# Patient Record
Sex: FEMALE | Race: WHITE | Employment: UNEMPLOYED | URBAN - METROPOLITAN AREA
[De-identification: names, ages, dates, MRNs, and addresses within clinical notes are randomized per-mention and may not be internally consistent; named-entity substitution may affect disease eponyms.]

---

## 2023-02-12 ENCOUNTER — HOSPITAL ENCOUNTER (INPATIENT)
Age: 58
LOS: 1 days | Discharge: LEFT BEFORE DISCHARGE | DRG: 392 | End: 2023-02-13
Attending: EMERGENCY MEDICINE | Admitting: INTERNAL MEDICINE
Payer: MEDICARE

## 2023-02-12 DIAGNOSIS — R10.84 GENERALIZED ABDOMINAL PAIN: Primary | ICD-10-CM

## 2023-02-12 DIAGNOSIS — R11.0 NAUSEA: ICD-10-CM

## 2023-02-12 PROBLEM — I77.4 MEDIAN ARCUATE LIGAMENT SYNDROME (HCC): Status: ACTIVE | Noted: 2023-02-12

## 2023-02-12 LAB
ANION GAP SERPL CALCULATED.3IONS-SCNC: 10 MMOL/L (ref 9–17)
BUN SERPL-MCNC: 14 MG/DL (ref 6–20)
CALCIUM SERPL-MCNC: 8.2 MG/DL (ref 8.6–10.4)
CHLORIDE SERPL-SCNC: 103 MMOL/L (ref 98–107)
CO2 SERPL-SCNC: 20 MMOL/L (ref 20–31)
CREAT SERPL-MCNC: 0.65 MG/DL (ref 0.5–0.9)
GFR SERPL CREATININE-BSD FRML MDRD: >60 ML/MIN/1.73M2
GLUCOSE SERPL-MCNC: 106 MG/DL (ref 70–99)
LACTIC ACID, SEPSIS WHOLE BLOOD: 0.6 MMOL/L (ref 0.5–1.9)
LACTIC ACID, SEPSIS WHOLE BLOOD: 1.2 MMOL/L (ref 0.5–1.9)
MAGNESIUM SERPL-MCNC: 1.6 MG/DL (ref 1.6–2.6)
POTASSIUM SERPL-SCNC: 3.8 MMOL/L (ref 3.7–5.3)
SODIUM SERPL-SCNC: 133 MMOL/L (ref 135–144)

## 2023-02-12 PROCEDURE — 96372 THER/PROPH/DIAG INJ SC/IM: CPT

## 2023-02-12 PROCEDURE — 83605 ASSAY OF LACTIC ACID: CPT

## 2023-02-12 PROCEDURE — 2580000003 HC RX 258: Performed by: STUDENT IN AN ORGANIZED HEALTH CARE EDUCATION/TRAINING PROGRAM

## 2023-02-12 PROCEDURE — 83735 ASSAY OF MAGNESIUM: CPT

## 2023-02-12 PROCEDURE — 6360000002 HC RX W HCPCS: Performed by: STUDENT IN AN ORGANIZED HEALTH CARE EDUCATION/TRAINING PROGRAM

## 2023-02-12 PROCEDURE — 87040 BLOOD CULTURE FOR BACTERIA: CPT

## 2023-02-12 PROCEDURE — 36415 COLL VENOUS BLD VENIPUNCTURE: CPT

## 2023-02-12 PROCEDURE — 99285 EMERGENCY DEPT VISIT HI MDM: CPT

## 2023-02-12 PROCEDURE — 80048 BASIC METABOLIC PNL TOTAL CA: CPT

## 2023-02-12 PROCEDURE — 2060000000 HC ICU INTERMEDIATE R&B

## 2023-02-12 PROCEDURE — 93005 ELECTROCARDIOGRAM TRACING: CPT | Performed by: STUDENT IN AN ORGANIZED HEALTH CARE EDUCATION/TRAINING PROGRAM

## 2023-02-12 RX ORDER — ATORVASTATIN CALCIUM 20 MG/1
20 TABLET, FILM COATED ORAL DAILY
Status: DISCONTINUED | OUTPATIENT
Start: 2023-02-13 | End: 2023-02-13 | Stop reason: HOSPADM

## 2023-02-12 RX ORDER — ONDANSETRON 2 MG/ML
4 INJECTION INTRAMUSCULAR; INTRAVENOUS EVERY 6 HOURS PRN
Status: DISCONTINUED | OUTPATIENT
Start: 2023-02-12 | End: 2023-02-13 | Stop reason: HOSPADM

## 2023-02-12 RX ORDER — ONDANSETRON 4 MG/1
4 TABLET, ORALLY DISINTEGRATING ORAL EVERY 8 HOURS PRN
Status: DISCONTINUED | OUTPATIENT
Start: 2023-02-12 | End: 2023-02-13 | Stop reason: HOSPADM

## 2023-02-12 RX ORDER — SODIUM CHLORIDE 0.9 % (FLUSH) 0.9 %
5-40 SYRINGE (ML) INJECTION EVERY 12 HOURS SCHEDULED
Status: DISCONTINUED | OUTPATIENT
Start: 2023-02-13 | End: 2023-02-13 | Stop reason: HOSPADM

## 2023-02-12 RX ORDER — LIOTHYRONINE SODIUM 5 UG/1
5 TABLET ORAL DAILY
Status: DISCONTINUED | OUTPATIENT
Start: 2023-02-13 | End: 2023-02-13 | Stop reason: HOSPADM

## 2023-02-12 RX ORDER — FAMOTIDINE 40 MG/1
40 TABLET, FILM COATED ORAL 2 TIMES DAILY
COMMUNITY

## 2023-02-12 RX ORDER — POLYETHYLENE GLYCOL 3350 17 G/17G
17 POWDER, FOR SOLUTION ORAL DAILY PRN
Status: DISCONTINUED | OUTPATIENT
Start: 2023-02-12 | End: 2023-02-13 | Stop reason: HOSPADM

## 2023-02-12 RX ORDER — 0.9 % SODIUM CHLORIDE 0.9 %
30 INTRAVENOUS SOLUTION INTRAVENOUS ONCE
Status: DISCONTINUED | OUTPATIENT
Start: 2023-02-12 | End: 2023-02-12

## 2023-02-12 RX ORDER — CLONAZEPAM 1 MG/1
1.5 TABLET ORAL 2 TIMES DAILY PRN
COMMUNITY

## 2023-02-12 RX ORDER — LOSARTAN POTASSIUM 50 MG/1
50 TABLET ORAL DAILY
Status: DISCONTINUED | OUTPATIENT
Start: 2023-02-13 | End: 2023-02-13 | Stop reason: HOSPADM

## 2023-02-12 RX ORDER — LIOTHYRONINE SODIUM 5 UG/1
5 TABLET ORAL DAILY
COMMUNITY

## 2023-02-12 RX ORDER — SODIUM CHLORIDE, SODIUM LACTATE, POTASSIUM CHLORIDE, CALCIUM CHLORIDE 600; 310; 30; 20 MG/100ML; MG/100ML; MG/100ML; MG/100ML
INJECTION, SOLUTION INTRAVENOUS CONTINUOUS
Status: DISCONTINUED | OUTPATIENT
Start: 2023-02-12 | End: 2023-02-13 | Stop reason: HOSPADM

## 2023-02-12 RX ORDER — ACETAMINOPHEN 325 MG/1
650 TABLET ORAL EVERY 6 HOURS PRN
Status: DISCONTINUED | OUTPATIENT
Start: 2023-02-12 | End: 2023-02-13 | Stop reason: HOSPADM

## 2023-02-12 RX ORDER — SODIUM CHLORIDE 9 MG/ML
INJECTION, SOLUTION INTRAVENOUS PRN
Status: DISCONTINUED | OUTPATIENT
Start: 2023-02-12 | End: 2023-02-13 | Stop reason: HOSPADM

## 2023-02-12 RX ORDER — PROMETHAZINE HYDROCHLORIDE 25 MG/ML
25 INJECTION, SOLUTION INTRAMUSCULAR; INTRAVENOUS ONCE
Status: COMPLETED | OUTPATIENT
Start: 2023-02-12 | End: 2023-02-12

## 2023-02-12 RX ORDER — FLUOXETINE HYDROCHLORIDE 20 MG/1
20 CAPSULE ORAL DAILY
Status: DISCONTINUED | OUTPATIENT
Start: 2023-02-13 | End: 2023-02-13 | Stop reason: HOSPADM

## 2023-02-12 RX ORDER — ATORVASTATIN CALCIUM 20 MG/1
20 TABLET, FILM COATED ORAL DAILY
COMMUNITY

## 2023-02-12 RX ORDER — ENOXAPARIN SODIUM 100 MG/ML
40 INJECTION SUBCUTANEOUS DAILY
Status: DISCONTINUED | OUTPATIENT
Start: 2023-02-13 | End: 2023-02-13 | Stop reason: HOSPADM

## 2023-02-12 RX ORDER — LEVOTHYROXINE SODIUM 112 UG/1
112 TABLET ORAL DAILY
Status: DISCONTINUED | OUTPATIENT
Start: 2023-02-13 | End: 2023-02-13 | Stop reason: HOSPADM

## 2023-02-12 RX ORDER — LEVOTHYROXINE SODIUM 112 UG/1
112 TABLET ORAL DAILY
COMMUNITY

## 2023-02-12 RX ORDER — ACETAMINOPHEN 650 MG/1
650 SUPPOSITORY RECTAL EVERY 6 HOURS PRN
Status: DISCONTINUED | OUTPATIENT
Start: 2023-02-12 | End: 2023-02-13 | Stop reason: HOSPADM

## 2023-02-12 RX ORDER — SODIUM CHLORIDE 0.9 % (FLUSH) 0.9 %
5-40 SYRINGE (ML) INJECTION PRN
Status: DISCONTINUED | OUTPATIENT
Start: 2023-02-12 | End: 2023-02-13 | Stop reason: HOSPADM

## 2023-02-12 RX ORDER — LOSARTAN POTASSIUM 50 MG/1
50 TABLET ORAL DAILY
COMMUNITY

## 2023-02-12 RX ORDER — FAMOTIDINE 20 MG/1
40 TABLET, FILM COATED ORAL 2 TIMES DAILY
Status: DISCONTINUED | OUTPATIENT
Start: 2023-02-13 | End: 2023-02-13 | Stop reason: HOSPADM

## 2023-02-12 RX ORDER — FLUOXETINE HYDROCHLORIDE 20 MG/1
20 CAPSULE ORAL DAILY
COMMUNITY

## 2023-02-12 RX ADMIN — PROMETHAZINE HYDROCHLORIDE 25 MG: 25 INJECTION INTRAMUSCULAR; INTRAVENOUS at 18:54

## 2023-02-12 RX ADMIN — SODIUM CHLORIDE 2721 ML: 9 INJECTION, SOLUTION INTRAVENOUS at 18:41

## 2023-02-12 RX ADMIN — SODIUM CHLORIDE, POTASSIUM CHLORIDE, SODIUM LACTATE AND CALCIUM CHLORIDE: 600; 310; 30; 20 INJECTION, SOLUTION INTRAVENOUS at 20:21

## 2023-02-12 ASSESSMENT — ENCOUNTER SYMPTOMS
ABDOMINAL PAIN: 1
SHORTNESS OF BREATH: 0
DIARRHEA: 0
CHOKING: 0
CHEST TIGHTNESS: 0
SORE THROAT: 0
ANAL BLEEDING: 0
VOMITING: 0
ABDOMINAL DISTENTION: 0
RHINORRHEA: 0
EYE DISCHARGE: 0
BACK PAIN: 0
EYE ITCHING: 0
APNEA: 0
COUGH: 0
COLOR CHANGE: 0
NAUSEA: 1
BLOOD IN STOOL: 0
CONSTIPATION: 0

## 2023-02-12 NOTE — ED PROVIDER NOTES
[facility] ED  Emergency Department Encounter  Emergency Medicine Resident     Pt Name:Lien Corbett  MRN: [id number]  [place]ig[place]ia 1965  Date of evaluation: 2/12/23  PCP:  BRITNI Quan CNP  Note Started: 5:30 PM EST      CHIEF COMPLAINT       Chief Complaint   Patient presents with    Abdominal Pain     X6 days    Nausea       HISTORY OF PRESENT ILLNESS  (Location/Symptom, Timing/Onset, Context/Setting, Quality, Duration, Modifying Factors, Severity.)      Hector Uribe is a 61 y.o. female who presents with nausea and abdominal pain for the past week. Patient reports 1 week prior to today she had a knee surgery on her right meniscus. She reports ever since the operation she has had abdominal pain and nausea. She reports that she has been taking Zofran at home for this problem with minimal improvement. She denies any active vomiting and reports having bowel movements in the last 24 hours. She denies any diarrhea or blood in her stool. She reports that her pain is severe and crampy and sharp throughout her abdomen and seems to come in waves. She reports that Ramond Ace she was in 10 out of 10 pain but after receiving pain medication her pain improved. She is currently in about 2 out of 10 pain and acknowledges no focal point of tenderness. Patient denies any chest pain or shortness of breath, fever, chills, cough, sore throat, runny nose. PAST MEDICAL / SURGICAL / SOCIAL / FAMILY HISTORY      has no past medical history on file. History of Hashimoto thyroiditis, sarcoidosis, rheumatoid arthritis, back injury, left wrist injury, GERD     has no past surgical history on file.   History of uvulopalatopharyngoplasty, EGD, kidney and ureter strictures, meniscus the right knee surgery    Social History     Socioeconomic History    Marital status: Single     Spouse name: Not on file    Number of children: Not on file    Years of education: Not on file    Highest education level: Not on file   Occupational History    Not on file   Tobacco Use    Smoking status: Not on file    Smokeless tobacco: Not on file   Substance and Sexual Activity    Alcohol use: Not on file    Drug use: Not on file    Sexual activity: Not on file   Other Topics Concern    Not on file   Social History Narrative    Not on file     Social Determinants of Health     Financial Resource Strain: Not on file   Food Insecurity: Not on file   Transportation Needs: Not on file   Physical Activity: Not on file   Stress: Not on file   Social Connections: Not on file   Intimate Partner Violence: Not on file   Housing Stability: Not on file       No family history on file. Allergies: Avelox [moxifloxacin], Bactrim [sulfamethoxazole-trimethoprim], Macrobid [nitrofurantoin], Sulfa antibiotics, Vancomycin, and Ibuprofen    Home Medications:  Prior to Admission medications    Medication Sig Start Date End Date Taking? Authorizing Provider   atorvastatin (LIPITOR) 20 MG tablet Take 20 mg by mouth daily   Yes Historical Provider, MD   clonazePAM (KLONOPIN) 1 MG tablet Take 1.5 mg by mouth 2 times daily as needed. Yes Historical Provider, MD   famotidine (PEPCID) 40 MG tablet Take 40 mg by mouth 2 times daily   Yes Historical Provider, MD   FLUoxetine (PROZAC) 20 MG capsule Take 20 mg by mouth daily   Yes Historical Provider, MD   levothyroxine (SYNTHROID) 112 MCG tablet Take 112 mcg by mouth Daily   Yes Historical Provider, MD   liothyronine (CYTOMEL) 5 MCG tablet Take 5 mcg by mouth daily   Yes Historical Provider, MD   losartan (COZAAR) 50 MG tablet Take 50 mg by mouth daily   Yes Historical Provider, MD     Patient takes fluoxetine, atorvastatin, clonazepam, famotidine, hydrochlorothiazide losartan, lansoprazole, levothyroxine and liothyronine    REVIEW OF SYSTEMS       Review of Systems   Constitutional:  Positive for appetite change. Negative for activity change, chills and fever.    HENT:  Negative for rhinorrhea and sore throat. Respiratory:  Negative for cough and shortness of breath. Cardiovascular:  Negative for chest pain. Gastrointestinal:  Positive for abdominal pain and nausea. Negative for abdominal distention, blood in stool, constipation, diarrhea and vomiting. Musculoskeletal:  Positive for joint swelling. Skin:  Positive for wound. Neurological:  Negative for dizziness and light-headedness. PHYSICAL EXAM      INITIAL VITALS:   /80   Pulse 86   Temp 100.1 °F (37.8 °C) (Oral)   Resp 16   Wt 200 lb (90.7 kg)   SpO2 96%     Physical Exam  Vitals reviewed. Constitutional:       General: She is not in acute distress. Appearance: She is not ill-appearing, toxic-appearing or diaphoretic. HENT:      Head: Normocephalic and atraumatic. Cardiovascular:      Rate and Rhythm: Normal rate and regular rhythm. Pulmonary:      Effort: Pulmonary effort is normal.      Breath sounds: Normal breath sounds. Abdominal:      General: Abdomen is flat. Bowel sounds are normal. There is no distension. Palpations: Abdomen is soft. Tenderness: There is generalized abdominal tenderness. Musculoskeletal:      Right lower leg: Edema present. Skin:     General: Skin is warm and dry. Comments: Right knee has 3 sites of sutures that are intact without erythema   Neurological:      General: No focal deficit present. Mental Status: She is alert and oriented to person, place, and time. DDX/DIAGNOSTIC RESULTS / EMERGENCY DEPARTMENT COURSE / MDM     Medical Decision Making  Patient is a 48-year-old female presenting with 1 week of waxing and waning nausea and abdominal pain. Patient was seen at [facility] and on CT was found to have suggestion of median arcuate ligament syndrome. Patient was transferred to [facility] for vascular work-up and evaluation.   Differential diagnosis includes gastritis, enteritis, ischemic mesentery, bowel ischemia, perforated viscus, choledocholithiasis, cholecystitis, nephrolithiasis. Amount and/or Complexity of Data Reviewed  Independent Historian: EMS     Details: Details also obtained from EMS and from paperwork from [facility]  External Data Reviewed: notes. Labs: ordered. Decision-making details documented in ED Course. ECG/medicine tests: ordered. Risk  Prescription drug management. Decision regarding hospitalization. EKG    All EKG's are interpreted by the Emergency Department Physician who either signs or Co-signs this chart in the absence of a cardiologist.    EMERGENCY DEPARTMENT COURSE:    ED Course as of 02/12/23 1956   Sun Feb 12, 2023 1730 Patient is a transfer from [place] [address] due to possible median arcuate ligament syndrome. Patient presented earlier to [place] [address] with abdominal pain nausea and vomiting for the past 7 days on and off worsening in the last 24 hours. Vascular surgery consulted at this time. [HO]   1731 Lactic at [place] [address] appears to have been 10.7 milligrams per deciliter. Will recheck. Patient's vital signs are stable at this time and she is reporting minimal pain at this time. She does however report nausea and will provide medications for the same. Will initiate fluid bolus. [HO]   1812 Lactic Acid, Sepsis, Whole Blood: 1.2 [HO]   1837 CBC and BMP obtained at [place] [address] appear within normal limits [HO]   1955 Vascular surgery requesting admission to internal medicine for further work-up tomorrow with mesenteric duplex, requesting that we keep patient n.p.o. at this time. Internal medicine consult placed. [HO]   1956 Patient will be handed off to another resident for signout to internal medicine. [HO]      ED Course User Index  Tracey Roberts MD       PROCEDURES:  none    CONSULTS:  IP CONSULT TO VASCULAR SURGERY  IP CONSULT TO INTERNAL MEDICINE    CRITICAL CARE:  There was significant risk of life threatening deterioration of patient's condition requiring my direct management. Critical care time less than 30 minutes, excluding any documented procedures. FINAL IMPRESSION      1. Generalized abdominal pain    2. Nausea          DISPOSITION / PLAN     DISPOSITION Decision To Admit 02/12/2023 07:38:16 PM      PATIENT REFERRED TO:  No follow-up provider specified.     DISCHARGE MEDICATIONS:  New Prescriptions    No medications on file       Cally Dawson MD  Emergency Medicine Resident    (Please note that portions of thisnote were completed with a voice recognition program.  Efforts were made to edit the dictations but occasionally words are mis-transcribed.)      Geo Martinez MD  Resident  02/12/23 4498

## 2023-02-12 NOTE — ED PROVIDER NOTES
[facility]     Emergency Department     Faculty Attestation    I performed a history and physical examination of the patient and discussed management with the resident. I reviewed the residents note and agree with the documented findings and plan of care. Any areas of disagreement are noted on the chart. I was personally present for the key portions of any procedures. I have documented in the chart those procedures where I was not present during the key portions. I have reviewed the emergency nurses triage note. I agree with the chief complaint, past medical history, past surgical history, allergies, medications, social and family history as documented unless otherwise noted below. For Physician Assistant/ Nurse Practitioner cases/documentation I have personally evaluated this patient and have completed at least one if not all key elements of the E/M (history, physical exam, and MDM). Additional findings are as noted. I have personally seen and evaluated the patient. I find the patient's history and physical exam are consistent with the NP/PA documentation. I agree with the care provided, treatment rendered, disposition and follow-up plan. 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 Patient recently had meniscal surgery on her knee, then started having abdominal pain over the last 7 days. Has had nausea, vomiting, and diarrhea. No blood in the emesis or diarrhea. Abdominal pain abruptly worsened last night, was unable to have bowel movement or vomit, and pain continued to worsen. Was seen at [facility] and found to have a lactate of 10.7, CT was obtained showing possible median arcuate ligament syndrome with celiac artery stenosis. Patient was transferred for vascular surgery evaluation. Currently pain is well controlled, but she is still nauseated.     Exam:  General : Laying on the bed, awake, alert, and in no acute distress  CV : normal rate and regular rhythm  Lungs : Breathing comfortably on room air with no tachypnea, hypoxia, or increased work of breathing        Medical Decision Making  Amount and/or Complexity of Data Reviewed  Labs: ordered. Decision-making details documented in ED Course. ECG/medicine tests: ordered and independent interpretation performed. Details: Normal sinus rhythm at 82 bpm. Normal intervals, normal axis. No ST segment elevation or depression. TWI in lead V1, flattening in lead III. No ischemic changes. Nonspecific EKG    Risk  Prescription drug management. Plan: We will continue to trend lactate, add cultures as it does not appear that septic work-up was initiated at [facility].  We will give IV fluids. Vascular surgery consulted upon arrival for further evaluation and treatment. Anticipate admission.     Jaimie Nielsen MD   Attending Emergency Physician    (Please note that portions of this note were completed with a voice recognition program. Efforts were made to edit the dictations but occasionally words are mis-transcribed.)            Jaimie Nielsen MD  02/12/23 1786       Jaimie Nielsen MD  02/12/23 2088

## 2023-02-12 NOTE — ED NOTES
The following labs were labeled with appropriate pt sticker and tubed to lab:     [] Blue     [] Lavender   [] on ice  [] Green/yellow  [x] Green/black [x] on ice  [] Ashley Gunning  [] on ice  [] Yellow  [] Red  [] Type/ Screen  [] ABG  [] VBG    [] COVID-19 swab    [] Rapid  [] PCR  [] Flu swab  [] Peds Viral Panel     [] Urine Sample  [] Fecal Sample  [] Pelvic Cultures  [x] Blood Cultures  [x] X 2  [] STREP Cultures         Maricruz Serrano RN  02/12/23 7098

## 2023-02-13 VITALS
BODY MASS INDEX: 36.6 KG/M2 | HEIGHT: 63 IN | RESPIRATION RATE: 18 BRPM | OXYGEN SATURATION: 94 % | SYSTOLIC BLOOD PRESSURE: 116 MMHG | TEMPERATURE: 97.9 F | HEART RATE: 83 BPM | WEIGHT: 206.57 LBS | DIASTOLIC BLOOD PRESSURE: 76 MMHG

## 2023-02-13 PROBLEM — R10.84 GENERALIZED ABDOMINAL PAIN: Status: ACTIVE | Noted: 2023-02-13

## 2023-02-13 PROBLEM — R11.0 NAUSEA: Status: ACTIVE | Noted: 2023-02-13

## 2023-02-13 PROBLEM — R11.0 NAUSEA: Status: RESOLVED | Noted: 2023-02-13 | Resolved: 2023-02-13

## 2023-02-13 PROBLEM — R10.84 GENERALIZED ABDOMINAL PAIN: Status: RESOLVED | Noted: 2023-02-13 | Resolved: 2023-02-13

## 2023-02-13 LAB
ABSOLUTE EOS #: 0.13 K/UL (ref 0–0.44)
ABSOLUTE IMMATURE GRANULOCYTE: 0.03 K/UL (ref 0–0.3)
ABSOLUTE LYMPH #: 1.83 K/UL (ref 1.1–3.7)
ABSOLUTE MONO #: 0.5 K/UL (ref 0.1–1.2)
ALBUMIN SERPL-MCNC: 3.6 G/DL (ref 3.5–5.2)
ALBUMIN/GLOBULIN RATIO: 1.6 (ref 1–2.5)
ALP SERPL-CCNC: 86 U/L (ref 35–104)
ALT SERPL-CCNC: 21 U/L (ref 5–33)
ANION GAP SERPL CALCULATED.3IONS-SCNC: 10 MMOL/L (ref 9–17)
AST SERPL-CCNC: 21 U/L
BASOPHILS # BLD: 0 % (ref 0–2)
BASOPHILS ABSOLUTE: <0.03 K/UL (ref 0–0.2)
BILIRUB DIRECT SERPL-MCNC: 0.2 MG/DL
BILIRUB INDIRECT SERPL-MCNC: 1.4 MG/DL (ref 0–1)
BILIRUB SERPL-MCNC: 1.6 MG/DL (ref 0.3–1.2)
BUN SERPL-MCNC: 13 MG/DL (ref 6–20)
CALCIUM SERPL-MCNC: 8.7 MG/DL (ref 8.6–10.4)
CHLORIDE SERPL-SCNC: 102 MMOL/L (ref 98–107)
CO2 SERPL-SCNC: 22 MMOL/L (ref 20–31)
CREAT SERPL-MCNC: 0.71 MG/DL (ref 0.5–0.9)
EKG ATRIAL RATE: 82 BPM
EKG P AXIS: 37 DEGREES
EKG P-R INTERVAL: 138 MS
EKG Q-T INTERVAL: 360 MS
EKG QRS DURATION: 74 MS
EKG QTC CALCULATION (BAZETT): 420 MS
EKG R AXIS: 48 DEGREES
EKG T AXIS: 43 DEGREES
EKG VENTRICULAR RATE: 82 BPM
EOSINOPHILS RELATIVE PERCENT: 2 % (ref 1–4)
GFR SERPL CREATININE-BSD FRML MDRD: >60 ML/MIN/1.73M2
GLUCOSE SERPL-MCNC: 108 MG/DL (ref 70–99)
HCT VFR BLD AUTO: 34.7 % (ref 36.3–47.1)
HGB BLD-MCNC: 11.5 G/DL (ref 11.9–15.1)
IMMATURE GRANULOCYTES: 1 %
LYMPHOCYTES # BLD: 32 % (ref 24–43)
MCH RBC QN AUTO: 28.5 PG (ref 25.2–33.5)
MCHC RBC AUTO-ENTMCNC: 33.1 G/DL (ref 28.4–34.8)
MCV RBC AUTO: 85.9 FL (ref 82.6–102.9)
MONOCYTES # BLD: 9 % (ref 3–12)
NRBC AUTOMATED: 0 PER 100 WBC
PDW BLD-RTO: 13 % (ref 11.8–14.4)
PLATELET # BLD AUTO: 196 K/UL (ref 138–453)
PMV BLD AUTO: 9.1 FL (ref 8.1–13.5)
POTASSIUM SERPL-SCNC: 3.9 MMOL/L (ref 3.7–5.3)
PROT SERPL-MCNC: 5.8 G/DL (ref 6.4–8.3)
RBC # BLD: 4.04 M/UL (ref 3.95–5.11)
SEG NEUTROPHILS: 56 % (ref 36–65)
SEGMENTED NEUTROPHILS ABSOLUTE COUNT: 3.21 K/UL (ref 1.5–8.1)
SODIUM SERPL-SCNC: 134 MMOL/L (ref 135–144)
WBC # BLD AUTO: 5.7 K/UL (ref 3.5–11.3)

## 2023-02-13 PROCEDURE — 6370000000 HC RX 637 (ALT 250 FOR IP): Performed by: STUDENT IN AN ORGANIZED HEALTH CARE EDUCATION/TRAINING PROGRAM

## 2023-02-13 PROCEDURE — 99222 1ST HOSP IP/OBS MODERATE 55: CPT | Performed by: INTERNAL MEDICINE

## 2023-02-13 PROCEDURE — 2580000003 HC RX 258: Performed by: STUDENT IN AN ORGANIZED HEALTH CARE EDUCATION/TRAINING PROGRAM

## 2023-02-13 PROCEDURE — 6370000000 HC RX 637 (ALT 250 FOR IP)

## 2023-02-13 PROCEDURE — 6360000002 HC RX W HCPCS: Performed by: STUDENT IN AN ORGANIZED HEALTH CARE EDUCATION/TRAINING PROGRAM

## 2023-02-13 PROCEDURE — 80076 HEPATIC FUNCTION PANEL: CPT

## 2023-02-13 PROCEDURE — 93010 ELECTROCARDIOGRAM REPORT: CPT | Performed by: INTERNAL MEDICINE

## 2023-02-13 PROCEDURE — 85025 COMPLETE CBC W/AUTO DIFF WBC: CPT

## 2023-02-13 PROCEDURE — 93975 VASCULAR STUDY: CPT

## 2023-02-13 PROCEDURE — 36415 COLL VENOUS BLD VENIPUNCTURE: CPT

## 2023-02-13 PROCEDURE — 80048 BASIC METABOLIC PNL TOTAL CA: CPT

## 2023-02-13 RX ORDER — MAGNESIUM SULFATE IN WATER 40 MG/ML
2000 INJECTION, SOLUTION INTRAVENOUS ONCE
Status: COMPLETED | OUTPATIENT
Start: 2023-02-13 | End: 2023-02-13

## 2023-02-13 RX ORDER — ACETAMINOPHEN 160 MG/5ML
650 SOLUTION ORAL EVERY 4 HOURS PRN
Status: DISCONTINUED | OUTPATIENT
Start: 2023-02-13 | End: 2023-02-13 | Stop reason: HOSPADM

## 2023-02-13 RX ADMIN — LIOTHYRONINE SODIUM 5 MCG: 5 TABLET ORAL at 10:31

## 2023-02-13 RX ADMIN — ACETAMINOPHEN 650 MG: 650 SOLUTION ORAL at 00:40

## 2023-02-13 RX ADMIN — FAMOTIDINE 40 MG: 20 TABLET, FILM COATED ORAL at 00:41

## 2023-02-13 RX ADMIN — LEVOTHYROXINE SODIUM 112 MCG: 112 TABLET ORAL at 10:31

## 2023-02-13 RX ADMIN — MAGNESIUM SULFATE HEPTAHYDRATE 2000 MG: 40 INJECTION, SOLUTION INTRAVENOUS at 00:44

## 2023-02-13 RX ADMIN — FLUOXETINE HYDROCHLORIDE 20 MG: 20 CAPSULE ORAL at 10:31

## 2023-02-13 RX ADMIN — LOSARTAN POTASSIUM 50 MG: 50 TABLET, FILM COATED ORAL at 10:32

## 2023-02-13 RX ADMIN — SODIUM CHLORIDE, POTASSIUM CHLORIDE, SODIUM LACTATE AND CALCIUM CHLORIDE: 600; 310; 30; 20 INJECTION, SOLUTION INTRAVENOUS at 00:15

## 2023-02-13 RX ADMIN — FAMOTIDINE 40 MG: 20 TABLET, FILM COATED ORAL at 10:31

## 2023-02-13 RX ADMIN — POTASSIUM BICARBONATE 20 MEQ: 782 TABLET, EFFERVESCENT ORAL at 00:40

## 2023-02-13 NOTE — PLAN OF CARE
Problem: Discharge Planning  Goal: Discharge to home or other facility with appropriate resources  2/13/2023 1814 by Marcy Strong RN  Outcome: Progressing  2/13/2023 0528 by Natalya Martinez RN  Outcome: Progressing     Problem: Safety - Adult  Goal: Free from fall injury  Outcome: Progressing

## 2023-02-13 NOTE — ED NOTES
Pt reports to the ED as a transfer from [address] [place] [address] with complaints of abdominal pain and nausea x6 days now. Pt states she had a meniscus repair of the R knee last Monday and has been experiencing these symptoms since. Pt states over the last 24 hours it has gotten worse and she tried zofran around 0500 this morning and pepcid around 0600 with no relief. Pt denies emesis and dark or bloody stools. CT results from [facility] show narrowing of the celiac axis origin with mild postenotic dilatation. Pt received 25 mg benadryl, 1 mg ativan, 2 g magnesium, 10 mg reglan, 4 mg morphine and 2 L NS at [facility]. Upon arrival, pt stating her pain is about a 2, stating she thinks the pain meds given at [address] [place] [address] are still working. However, pt is stating she is still nauseous at this time. Pt has a hx of GERD. Pt does not have any other complaints at this time. Pt is A&O x4 and speaking in complete sentences. Pt is resting in bed comfortably, NAD noted. Pt denies chest pain, SOB.  Pt placed on full cardiac monitor     Hayley Anders RN  02/12/23 1931

## 2023-02-13 NOTE — ED PROVIDER NOTES
FACULTY SIGN-OUT  ADDENDUM       Patient: Alvina Pate   MRN: [id number]  PCP:  BRITNI Chua - CNP  Attestation  I was available and discussed any additional care issues that arose and coordinated the management plans with the resident(s) caring for the patient during my duty period. Any areas of disagreement with resident's documentation of care or procedures are noted on the chart. I was personally present for the key portions of any/all procedures during my duty period. I have documented in the chart those procedures where I was not present during the key portions. The patient's initial evaluation and plan have been discussed with the prior provider who initially evaluated the patient. Pertinent Comments: The patient is a 61 y.o. female taken in signout with transfer from [facility] with median arcuate ligament syndrome and vascular surgery consult.     Lactate initially elevated prior to arrival now down to 0.6  We are awaiting admission and vascular surgery consult in the emergency room    ED COURSE      The patient was given the following medications:  Orders Placed This Encounter   Medications    promethazine (PHENERGAN) injection 25 mg    DISCONTD: 0.9 % sodium chloride bolus    lactated ringers IV soln infusion       RECENT VITALS:   BP: 123/80  Heart Rate: 86  Resp: 16  Temp: 100.1 °F (37.8 °C) SpO2: 96 %    (Please note that portions of this note were completed with a voice recognition program.  Efforts were made to edit the dictations but occasionally words are mis-transcribed.)    MD Maureen Mcneal [facility]  Attending Emergency Medicine Physician       Rafael Yu MD  02/12/23 2011

## 2023-02-13 NOTE — PROGRESS NOTES
Internal medicine senior note    59-year-old female with past medical history anxiety, GERD, sarcoidosis, hypothyroidism, hypertension presents as transfer from outside facility for abdominal pain and suspected median arcuate ligament syndrome. Work-up at outside hospital significant for no leukocytosis, hemoglobin 14, magnesium 1.49, potassium 3.7, creatinine 0.88 with BUN of 20, slight bilirubin elevation 2.1. Lactic acidosis which has resolved since arrival.  Vascular consulted from ED recommending n.p.o. and Mesenteric duplex in a.m.    #Suspected median arcuate ligament syndrome  #Hypomagnesemia  #Hyperbilirubinemia, slight  #Hypothyroidism  #Hypertension  #Anxiety  -Continue home medications  -N.p.o. for duplex in a.m.  -Vascular surgery following. Appreciate recommendations  -We will repeat BMP and magnesium levels. Trend labs daily.   Lactic acidosis has resolved      DVT prophylaxis Lovenox  IV fluids:   Consults: Vascular  Diet: N.p.o.  Chaz Dominguez,   Internal Medicine, PGY2  Please Perfect Serve with any questions or concerns

## 2023-02-13 NOTE — PROGRESS NOTES
Division of Vascular Surgery             Progress Note      Name: Sydney Day  MRN: [id number]         Overnight Events:     No acute overnight events. Patient's abdominal pain is significantly, denies nausea, vomiting or shortness of breath. Subjective:     65-year-old female presents as a transfer with concerns for making acute ligament syndrome    Physical Exam:     Vitals:  /76   Pulse 83   Temp 97.9 °F (36.6 °C) (Oral)   Resp 18   Ht 5' 3\" (1.6 m)   Wt 206 lb 9.1 oz (93.7 kg)   SpO2 94%   BMI 36.59 kg/m²       General appearance - alert, well appearing and in no acute distress  Mental status - oriented to person, place and time with normal affect  Head - normocephalic and atraumatic  Neck - supple  Chest - clear to auscultation, normal effort  Heart - normal rate, regular rhythm, no murmurs  Abdomen - soft, non-tender, non-distended  Neurological - normal speech, no focal findings or movement disorder noted  Extremities - peripheral pulses palpable, no pedal edema or calf pain with palpation  Skin - no gross lesions, rashes, or induration noted  Vascular Exam -palpable bilateral radial, bilateral femoral, bilateral PT and DP pulses      Imaging:     VL MESENTERIC ARTERY DUPLEX SCAN    (Results Pending)           Assessment:     Puja Corbett, 65-year-old female who presented with concerns for median arcuate ligament syndrome      Plan:     No acute vascular surgery intervention indicated at this time. Patient has minimal abdominal pain, vital signs remain normal.    Okay for regular diet  Patient is appropriate for discharge. Follow-up with Dr. Atiya Casanova in 6 months    Angelo Mcbride MD  PGY-3 General Surgery  4:18 PM 02/13/23       [address] [place] [address] Street: [phone]  C: [phone]  Email: [email or web address]

## 2023-02-13 NOTE — PROGRESS NOTES
Patient has been requesting all day to be discharged, doctor notified patient of fever and stay overnight for observation incase fever reoccurred. Patient still wants to leave AMA, IV taken out patient informed of risks and benefits of leaving, writer notified physician.

## 2023-02-13 NOTE — H&P
[address]     Department of Internal Medicine - Staff Internal Medicine Teaching Service          ADMISSION NOTE/HISTORY AND PHYSICAL EXAMINATION   Date: 2/12/2023  Patient Name: Rupal Corbett  Date of admission: 2/12/2023  4:51 PM  YOB: 1965  PCP: BRITNI Romero CNP  History Obtained From:  patient, electronic medical record    CHIEF COMPLAINT     Chief complaint: Episodic abdominal pain associated with nausea and vomiting    HISTORY OF PRESENTING ILLNESS     The patient is a pleasant 61 y.o. female with past medical history of anxiety, autoimmune disease, GERD, sarcoidosis, hypothyroidism, hypertension presents with a chief complaint of episodic abdominal pain associated with nausea and vomiting over the last 7 days. Located in umbilical region, cramp-like, on and off. No blood in the vomit or stool. This time episode started last night when she could not vomit or have a bowel movement and continued to worsen patient went to [facility] where she was found to have a lactate of 10. 7. CT abdomen showed possible celiac artery stenosis at origin concerning for median arcuate ligament syndrome-received 2 L fluid bolus and was transferred to [facility] for vascular surgery evaluation. Patient had a recent right meniscal repair around a week ago. Patient denies taking any antiplatelet or blood thinner. On examination abdomen was soft and nontender. Patient had a temperature of 100.1, otherwise vitally stable, repeat lactate was 0.6. Vascular surgery recommended mesenteric duplex in the morning. Patient admitted to the internal medicine floor for further management.   Patient has a history of dysphagia with multiple instances of choking and emesis where she is will suddenly feel like something getting stuck in her stroke and then will not be able to breathe until she vomits for around 15 minutes and she frequently had people do the Heimlich maneuver on her but without any expulsion of food bolus rather she will have emesis and eventually recovers. Has been following [facility] clinic. EGD in November 2021 and June 2022 showed gastric ulcer and bleeding which improved after stopping NSAIDs-she takes lansoprazole    Review of Systems:  General ROS: Completed and except as mentioned above were negative   HEENT ROS: Completed and except as mentioned above were negative   Allergy and Immunology ROS:  Completed and except as mentioned above were negative  Hematological and Lymphatic ROS:  Completed and except as mentioned above were negative  Respiratory ROS:  Completed and except as mentioned above were negative  Cardiovascular ROS:  Completed and except as mentioned above were negative  Gastrointestinal ROS: Completed and except as mentioned above were negative  Genito-Urinary ROS:  Completed and except as mentioned above were negative  Musculoskeletal ROS:  Completed and except as mentioned above were negative  Neurological ROS:  Completed and except as mentioned above were negative  Skin & Dermatological ROS:  Completed and except as mentioned above were negative  Psychological ROS:  Completed and except as mentioned above were negative    PAST MEDICAL HISTORY     No past medical history on file. PAST SURGICAL HISTORY     No past surgical history on file. ALLERGIES     Avelox [moxifloxacin], Bactrim [sulfamethoxazole-trimethoprim], Macrobid [nitrofurantoin], Sulfa antibiotics, Vancomycin, and Ibuprofen    MEDICATIONS PRIOR TO ADMISSION     Prior to Admission medications    Medication Sig Start Date End Date Taking? Authorizing Provider   atorvastatin (LIPITOR) 20 MG tablet Take 20 mg by mouth daily   Yes Historical Provider, MD   clonazePAM (KLONOPIN) 1 MG tablet Take 1.5 mg by mouth 2 times daily as needed.    Yes Historical Provider, MD   famotidine (PEPCID) 40 MG tablet Take 40 mg by mouth 2 times daily   Yes Historical Provider, MD   FLUoxetine (PROZAC) 20 MG capsule Take 20 mg by mouth daily   Yes Historical Provider, MD   levothyroxine (SYNTHROID) 112 MCG tablet Take 112 mcg by mouth Daily   Yes Historical Provider, MD   liothyronine (CYTOMEL) 5 MCG tablet Take 5 mcg by mouth daily   Yes Historical Provider, MD   losartan (COZAAR) 50 MG tablet Take 50 mg by mouth daily   Yes Historical Provider, MD       SOCIAL HISTORY     Tobacco: No  Alcohol: No  Illicits: No    FAMILY HISTORY     No family history on file. PHYSICAL EXAM     Vitals: /80   Pulse 86   Temp 100.1 °F (37.8 °C) (Oral)   Resp 16   Wt 200 lb (90.7 kg)   SpO2 96%   Tmax: Temp (24hrs), Av.1 °F (37.8 °C), Min:100.1 °F (37.8 °C), Max:100.1 °F (37.8 °C)    Last Body weight:   Wt Readings from Last 3 Encounters:   23 200 lb (90.7 kg)     Body Mass Index : There is no height or weight on file to calculate BMI. PHYSICAL EXAMINATION:  Constitutional: This is a well developed, well nourished, 30-34.9 - Obesity Grade I 61y.o. year old female who is alert, oriented, cooperative and in no apparent distress. Head:normocephalic and atraumatic. EENT:  PERRLA. No conjunctival injections. Septum was midline, mucosa was without erythema, exudates or cobblestoning. No thrush was noted. Neck: Supple without thyromegaly. No elevated JVP. Trachea was midline. Respiratory: Chest was symmetrical without dullness to percussion. Breath sounds bilaterally were clear to auscultation. There were no wheezes, rhonchi or rales. There is no intercostal retraction or use of accessory muscles. No egophony noted. Cardiovascular: Regular without murmur, clicks, gallops or rubs. Abdomen: Slightly rounded and soft without organomegaly. No rebound, rigidity or guarding was appreciated. Lymphatic: No lymphadenopathy. Musculoskeletal: Normal curvature of the spine. No gross muscle weakness. Extremities:  No lower extremity edema, ulcerations, tenderness, varicosities or erythema. Muscle size, tone and strength are normal.  No involuntary movements are noted. Skin:  Warm and dry. Good color, turgor and pigmentation. No lesions or scars. No cyanosis or clubbing  Neurological/Psychiatric: The patient's general behavior, level of consciousness, thought content and emotional status is normal.          INVESTIGATIONS     Laboratory Testing:     Recent Results (from the past 24 hour(s))   Blood Culture 1    Collection Time: 02/12/23  5:37 PM    Specimen: Blood   Result Value Ref Range    Specimen Description . BLOOD     Special Requests R WRIST 10ML     Culture NO GROWTH <24 HRS    Culture, Blood 2    Collection Time: 02/12/23  5:49 PM    Specimen: Blood   Result Value Ref Range    Specimen Description . BLOOD     Special Requests rac 10ml     Culture NO GROWTH <24 HRS    Lactate, Sepsis    Collection Time: 02/12/23  5:55 PM   Result Value Ref Range    Lactic Acid, Sepsis, Whole Blood 1.2 0.5 - 1.9 mmol/L   Lactate, Sepsis    Collection Time: 02/12/23  7:37 PM   Result Value Ref Range    Lactic Acid, Sepsis, Whole Blood 0.6 0.5 - 1.9 mmol/L       Imaging:   No results found. 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 We will continue Ringer lactate 100 mL/h.  -N.p.o. with duplex mesentery in the morning.  -Vascular surgery following-recommendations will be appreciated.  -As needed Zofran for nausea. -As needed morphine for pain  -We will resume home meds-Lipitor 20 mg, Pepcid 40 mg, fluoxetine 20 mg, levothyroxine 112 mcg, liothyronine 5 mcg, losartan 50 mg    Fever-likely secondary to mesenteric ischemia  -No elevated white count, blood cultures pending.  -We will monitor. DVT ppx: Lovenox  GI ppx: Already on Pepcid    PT/OT/SW-consulted  Discharge Planning:  consulted-we will follow-up    Arelis Alvarado MD  Internal Medicine Resident, PGY-1  [facility];  [place], [place]  2/12/2023, 8:55 PM

## 2023-02-13 NOTE — CONSULTS
Division of Vascular Surgery        New Consult        Reason for Consult:   Consult for possible median arcuate ligament syndrome    Chief Complaint:      Abdominal pain    History of Present Illness:      Wanda Patterson is a 61 y.o. female with history of anxiety, autoimmune disease GERD was transferred from [facility] with concern for possible median arcuate ligament syndrome present with abdominal pain with nausea. Patient has had least surgery on Monday, and she was having abdominal pain since then. Last night around 5 AM, patient woke up with sudden severe abdominal pain with nausea. She stated she cannot vomit or have a bowel movement, abdominal pain was worsened to a point that makes her decided to come to the hospital.  Pain is located all over her abdomen. Normally, her abdominal pain may associated with diet per patient. Patient was seen at [facility] and found to have a lactate of 10.7, CT was obtained showing possible median arcuate ligament syndrome with celiac artery stenosis patient denies of having bloody bowel movement. She takes famotidine and lansoprazole daily for gastric reflux. Denies chest pain or shortness of breath. Noticed more frequent diarrhea last couple days. Denies of taking antiplatelet or anticoagulation. Medical History:     No past medical history on file. Surgical History:     No past surgical history on file. Family History:     No family history on file. Allergies:        Avelox [moxifloxacin], Bactrim [sulfamethoxazole-trimethoprim], Macrobid [nitrofurantoin], Sulfa antibiotics, Vancomycin, and Ibuprofen    Medications:      Current Facility-Administered Medications   Medication Dose Route Frequency Provider Last Rate Last Admin    0.9 % sodium chloride bolus  30 mL/kg IntraVENous Once Juan Carlos Cutler MD 1,349.3 mL/hr at 02/12/23 1841 2,721 mL at 02/12/23 1841     Current Outpatient Medications   Medication Sig Dispense Refill atorvastatin (LIPITOR) 20 MG tablet Take 20 mg by mouth daily      clonazePAM (KLONOPIN) 1 MG tablet Take 1.5 mg by mouth 2 times daily as needed. famotidine (PEPCID) 40 MG tablet Take 40 mg by mouth 2 times daily      FLUoxetine (PROZAC) 20 MG capsule Take 20 mg by mouth daily      levothyroxine (SYNTHROID) 112 MCG tablet Take 112 mcg by mouth Daily      liothyronine (CYTOMEL) 5 MCG tablet Take 5 mcg by mouth daily      losartan (COZAAR) 50 MG tablet Take 50 mg by mouth daily         Social History:     Tobacco:    has no history on file for tobacco use. Alcohol:      has no history on file for alcohol use. Drug Use:  has no history on file for drug use. Review of Systems:     Review of Systems   Constitutional:  Positive for appetite change, chills and fatigue. Negative for activity change. HENT:  Negative for congestion, drooling and hearing loss. Eyes:  Negative for discharge and itching. Respiratory:  Negative for apnea, cough, choking, chest tightness and shortness of breath. Cardiovascular:  Negative for chest pain. Gastrointestinal:  Positive for abdominal pain. Negative for anal bleeding, blood in stool and constipation. Endocrine: Negative for polydipsia and polyuria. Genitourinary:  Negative for difficulty urinating, dysuria and frequency. Musculoskeletal:  Positive for arthralgias. Negative for back pain and joint swelling. Skin:  Negative for color change. Allergic/Immunologic: Negative for environmental allergies. Neurological:  Negative for dizziness, numbness and headaches. Psychiatric/Behavioral:  Negative for agitation, behavioral problems and confusion. Physical Exam:     Vitals:  /80   Pulse 86   Temp 100.1 °F (37.8 °C) (Oral)   Resp 18   Wt 200 lb (90.7 kg)   SpO2 97%     Physical Exam  Constitutional:       Appearance: Normal appearance. HENT:      Head: Normocephalic and atraumatic.       Right Ear: External ear normal.      Left Ear: External ear normal.      Nose: Nose normal.      Mouth/Throat:      Mouth: Mucous membranes are moist.   Eyes:      Conjunctiva/sclera: Conjunctivae normal.   Cardiovascular:      Rate and Rhythm: Normal rate and regular rhythm. Pulses: Normal pulses. Pulmonary:      Effort: Pulmonary effort is normal.   Abdominal:      General: There is no distension. Tenderness: There is abdominal tenderness. There is no guarding or rebound. Comments: Abdomen mildly tender to touch, pain has subsided per patient due to current pain medication. Soft, nondistended, no rebound or guarding. Genitourinary:     General: Normal vulva. Musculoskeletal:         General: Normal range of motion. Cervical back: Normal range of motion and neck supple. Skin:     General: Skin is warm. Capillary Refill: Capillary refill takes less than 2 seconds. Neurological:      General: No focal deficit present. Mental Status: She is alert and oriented to person, place, and time. Psychiatric:         Mood and Affect: Mood normal.         Behavior: Behavior normal.       Imaging/Labs:     No results found. Assessment and Plan:     61years old female presented with abdominal pain CT scan concerning for median arcuate ligament syndrome    -Stenosis noticed at origin of celiac artery without flow limitation; Therefore, will obtain a mesenteric duplex to further evaluate for flow characteristics.  Patient's abdominal exam is currently benign and without elevation of lactic acid, unlikely for mesenteric ischemia.   -No acute surgical intervention was indicated at this point, vascular surgery will continue to follow  -Continue IV hydration  -NPO for mesenteric duplex in the morning      Electronically signed by Agueda Horan DO on 2/12/23 at 7:12 PM [facility]  Office: [phone]  Cell: [phone]

## 2023-02-13 NOTE — ED NOTES
The following labs were labeled with appropriate pt sticker and tubed to lab:     [] Blue     [x] Lavender   [] on ice  [x] Green/yellow  [] Green/black [] on ice  [] Erroll Hoe  [] on ice  [] Yellow  [] Red  [] Type/ Screen  [] ABG  [] VBG    [] COVID-19 swab    [] Rapid  [] PCR  [] Flu swab  [] Peds Viral Panel     [] Urine Sample  [] Fecal Sample  [] Pelvic Cultures  [] Blood Cultures  [] X 2  [] STREP Cultures         Steven Becker RN  02/12/23 2121

## 2023-02-13 NOTE — ED PROVIDER NOTES
[address] ED  Emergency Department  Emergency Medicine Resident Sign-out     Care of Moody Corbett was assumed from Dr. Kori Evangelista and is being seen for Abdominal Pain (X6 days) and Nausea  . The patient's initial evaluation and plan have been discussed with the prior provider who initially evaluated the patient. EMERGENCY DEPARTMENT COURSE / MEDICAL DECISION MAKING:       MEDICATIONS GIVEN:  Orders Placed This Encounter   Medications    promethazine (PHENERGAN) injection 25 mg    DISCONTD: 0.9 % sodium chloride bolus    lactated ringers IV soln infusion       LABS / RADIOLOGY:     Labs Reviewed   CULTURE, BLOOD 1   CULTURE, BLOOD 2   LACTATE, SEPSIS   LACTATE, SEPSIS       No orders to display       RECENT VITALS:     Temp: 100.1 °F (37.8 °C),  Heart Rate: 86, Resp: 16, BP: 123/80, SpO2: 96 %    This patient is a 61 y.o. Female presents as a transfer from [place] [address] for concerns of mesenteric ischemia. Patient evaluated by surgery while here, given Phenergan for nausea. Abdominal pain is minimal at this time. Awaiting admission orders. Lactic acid 1. ED Course as of 02/12/23 2007   Sun Feb 12, 2023 1730 Patient is a transfer from [place] [address] due to possible median arcuate ligament syndrome. Patient presented earlier to [place] [address] with abdominal pain nausea and vomiting for the past 7 days on and off worsening in the last 24 hours. Vascular surgery consulted at this time. [HO]   1731 Lactic at [place] [address] appears to have been 10.7 milligrams per deciliter. Will recheck. Patient's vital signs are stable at this time and she is reporting minimal pain at this time. She does however report nausea and will provide medications for the same. Will initiate fluid bolus.  [HO]   1812 Lactic Acid, Sepsis, Whole Blood: 1.2 [HO]   1837 CBC and BMP obtained at [place] [address] appear within normal limits [HO]   1955 Vascular surgery requesting admission to internal medicine for further work-up tomorrow with mesenteric duplex, requesting that we keep patient n.p.o. at this time. Internal medicine consult placed. [HO]   1956 Patient will be handed off to another resident for signout to internal medicine. [HO]   2000 Spoke with internal medicine who are excepting the patient at this time. [HO]      ED Course User Renzo Gregorio MD         OUTSTANDING TASKS / RECOMMENDATIONS:      Await bed placement     FINAL IMPRESSION:     1. Generalized abdominal pain    2. Nausea        DISPOSITION:       DISPOSITION:  []  Discharge   []  Transfer -    [x]  Admission -     []  Against Medical Advice   []  Eloped   FOLLOW-UP: No follow-up provider specified.    DISCHARGE MEDICATIONS: New Prescriptions    No medications on file          Hung Gill DO  Emergency Medicine Resident  [facility]       Hung Gill [place]  Resident  02/13/23 9763

## 2023-02-14 PROBLEM — I77.4 CELIAC ARTERY STENOSIS: Status: ACTIVE | Noted: 2023-02-14

## 2023-02-14 PROBLEM — I77.1 CELIAC ARTERY STENOSIS (HCC): Status: ACTIVE | Noted: 2023-02-14

## 2023-02-15 NOTE — DISCHARGE SUMMARY
[facility]     Department of Internal Medicine - Staff Internal Medicine Teaching Service    INPATIENT DISCHARGE SUMMARY      Patient Identification:  Erika Gorman is a 61 y.o. female. :  1965  MRN: [id number]     Acct: [de-identified]   PCP: BRITNI Bingham CNP  Admit Date:  2023  Discharge date and time: 2023  6:26 PM   Attending Provider: Dr. Andrés Buchanan MD                                    [facility] Problem Lists:  Principal Problem:    Median arcuate ligament syndrome ([facility] [address]  Active Problems:    Celiac artery stenosis ([facility] [address]  Resolved Problems:    Generalized abdominal pain    Nausea      HOSPITAL STAY     Brief Inpatient course: The patient is a pleasant 61 y.o. female with past medical history of anxiety, autoimmune disease, GERD, sarcoidosis, hypothyroidism, hypertension presented with a chief complaint of episodic abdominal pain associated with nausea and vomiting over the past 7 days. Located in umbilical region, cramp-like, on and off. No blood in the vomit or stool. This time episode started previous night when she could not vomit or have a bowel movement and continued to worsen patient went to [facility] where she was found to have a lactate of 10. 7. CT abdomen showed possible celiac artery stenosis at origin concerning for median arcuate ligament syndrome-received 2 L fluid bolus and was transferred to [facility] for vascular surgery evaluation. Patient had a recent right meniscal repair around a week ago. Patient denies taking any antiplatelet or blood thinner. On examination abdomen was soft and nontender. Patient had a temperature of 100.1, otherwise vitally stable, repeat lactate was 0.6. Vascular surgery recommended mesenteric duplex in the morning. Patient admitted to the internal medicine floor for further management.   Patient's abdominal pain gradually improved overnight and had no more episodes of nausea or vomiting. Patient received mesenteric duplex in the morning which showed no stenosis in celiac artery. Patient left AMA before being seen by primary team after the mesenteric Duplex. Procedures/ Significant Interventions:    None    Consults:     Consults:     Final Specialist Recommendations/Findings:   IP CONSULT TO VASCULAR SURGERY  IP CONSULT TO INTERNAL MEDICINE  IP CONSULT TO CASE MANAGEMENT      Any Hospital Acquired Infections: none    Discharge Functional Status:  stable    DISCHARGE PLAN     Disposition: Patient left AMA    Patient Instructions:   Discharge Medication List as of 2/13/2023  6:27 PM        CONTINUE these medications which have NOT CHANGED    Details   atorvastatin (LIPITOR) 20 MG tablet Take 20 mg by mouth dailyHistorical Med      clonazePAM (KLONOPIN) 1 MG tablet Take 1.5 mg by mouth 2 times daily as needed. Historical Med      famotidine (PEPCID) 40 MG tablet Take 40 mg by mouth 2 times dailyHistorical Med      FLUoxetine (PROZAC) 20 MG capsule Take 20 mg by mouth dailyHistorical Med      levothyroxine (SYNTHROID) 112 MCG tablet Take 112 mcg by mouth DailyHistorical Med      liothyronine (CYTOMEL) 5 MCG tablet Take 5 mcg by mouth dailyHistorical Med      losartan (COZAAR) 50 MG tablet Take 50 mg by mouth dailyHistorical Med             Activity: activity as tolerated    Diet: regular diet    Follow-up:    Charles Holbrook, BRITNI - CNP  [address] [address]  [phone]    Follow up in 1 week(s)  post hosp follow up    [address]  [facility] [address]  [phone]    Follow up in 6 month(s)  Re-evaluation for MALS      Patient Instructions: Your mesenteric duplex was negative for any narrowing of your abdominal artery. Please come to the ER if you have any worsening of symptoms. Please take your medications as prescribed. Please follow-up with your PCP and vascular surgery outpatient.     Follow up labs: None  Follow up imaging: None    Note that over 30 minutes was spent in preparing discharge papers, discussing discharge with patient, medication review, etc.      Duyen Alatorre MD  Internal Medicine Resident, PGY-1  [address] [place], [place]  2/15/2023, 8:35 AM

## 2023-02-17 LAB
MICROORGANISM SPEC CULT: NORMAL
MICROORGANISM SPEC CULT: NORMAL
SERVICE CMNT-IMP: NORMAL
SERVICE CMNT-IMP: NORMAL
SPECIMEN DESCRIPTION: NORMAL
SPECIMEN DESCRIPTION: NORMAL

## 2024-04-25 ENCOUNTER — APPOINTMENT (OUTPATIENT)
Dept: RHEUMATOLOGY | Facility: CLINIC | Age: 59
End: 2024-04-25

## 2024-11-04 ENCOUNTER — HOSPITAL ENCOUNTER (OUTPATIENT)
Dept: RADIOLOGY | Facility: HOSPITAL | Age: 59
Discharge: HOME | End: 2024-11-04
Payer: MEDICARE

## 2024-11-04 DIAGNOSIS — E78.00 PURE HYPERCHOLESTEROLEMIA, UNSPECIFIED: ICD-10-CM

## 2024-11-04 PROCEDURE — 75571 CT HRT W/O DYE W/CA TEST: CPT

## 2025-07-29 ENCOUNTER — OFFICE VISIT (OUTPATIENT)
Age: 60
End: 2025-07-29

## 2025-07-29 VITALS
HEIGHT: 63 IN | TEMPERATURE: 97.7 F | HEART RATE: 99 BPM | BODY MASS INDEX: 36.5 KG/M2 | WEIGHT: 206 LBS | OXYGEN SATURATION: 98 %

## 2025-07-29 DIAGNOSIS — K64.3 GRADE IV HEMORRHOIDS: Primary | ICD-10-CM

## 2025-07-29 ASSESSMENT — ENCOUNTER SYMPTOMS
DIARRHEA: 0
SHORTNESS OF BREATH: 0
ABDOMINAL PAIN: 0
CONSTIPATION: 0
CHEST TIGHTNESS: 0

## 2025-07-29 NOTE — PROGRESS NOTES
Subjective:      Patient ID: Lien Corbett is a 59 y.o. female who presents for:  Chief Complaint   Patient presents with    New Patient       This is a 59-year-old female who has complicated hemorrhoid disease for many years.  They have been problematic for the last few years.    Her last colonoscopy was about 2021.    She complains of difficulty with perianal hygiene as well as discomfort in the rectal area.    Denies abdominal pain or any unintentional weight loss.        No past medical history on file.  No past surgical history on file.  Social History     Socioeconomic History    Marital status: Single     Spouse name: Not on file    Number of children: Not on file    Years of education: Not on file    Highest education level: Not on file   Occupational History    Not on file   Tobacco Use    Smoking status: Never    Smokeless tobacco: Never   Vaping Use    Vaping status: Never Used   Substance and Sexual Activity    Alcohol use: Not on file    Drug use: Not on file    Sexual activity: Not on file   Other Topics Concern    Not on file   Social History Narrative    Not on file     Social Drivers of Health     Financial Resource Strain: Low Risk  (4/11/2024)    Received from [facility]    Overall Financial Resource Strain (CARDIA)     Difficulty of Paying Living Expenses: Not very hard   Food Insecurity: Food Insecurity Present (4/11/2024)    Received from [facility]    Hunger Vital Sign     Worried About Running Out of Food in the Last Year: Often true     Ran Out of Food in the Last Year: Often true   Transportation Needs: No Transportation Needs (4/11/2024)    Received from [facility]    PRAPARE - Transportation     Lack of Transportation (Medical): No     Lack of Transportation (Non-Medical): No   Physical Activity: Insufficiently Active (4/11/2024)    Received from [facility]    Exercise Vital Sign     Days of Exercise per Week: 2 days     Minutes of Exercise per Session: 60 min